# Patient Record
Sex: FEMALE | Race: WHITE | NOT HISPANIC OR LATINO | URBAN - METROPOLITAN AREA
[De-identification: names, ages, dates, MRNs, and addresses within clinical notes are randomized per-mention and may not be internally consistent; named-entity substitution may affect disease eponyms.]

---

## 2017-03-09 PROBLEM — Z00.00 ENCOUNTER FOR PREVENTIVE HEALTH EXAMINATION: Status: ACTIVE | Noted: 2017-03-09

## 2021-04-08 ENCOUNTER — INPATIENT (INPATIENT)
Facility: HOSPITAL | Age: 86
LOS: 0 days | Discharge: LEFT BEFORE DISCHARGE | End: 2021-04-08
Attending: INTERNAL MEDICINE | Admitting: INTERNAL MEDICINE
Payer: MEDICARE

## 2021-04-08 VITALS
SYSTOLIC BLOOD PRESSURE: 148 MMHG | HEART RATE: 84 BPM | OXYGEN SATURATION: 100 % | RESPIRATION RATE: 18 BRPM | DIASTOLIC BLOOD PRESSURE: 83 MMHG

## 2021-04-08 VITALS
TEMPERATURE: 97 F | HEART RATE: 104 BPM | DIASTOLIC BLOOD PRESSURE: 80 MMHG | OXYGEN SATURATION: 98 % | SYSTOLIC BLOOD PRESSURE: 182 MMHG | RESPIRATION RATE: 18 BRPM

## 2021-04-08 LAB
ALBUMIN SERPL ELPH-MCNC: 4.9 G/DL — SIGNIFICANT CHANGE UP (ref 3.5–5.2)
ALP SERPL-CCNC: 86 U/L — SIGNIFICANT CHANGE UP (ref 30–115)
ALT FLD-CCNC: 6 U/L — SIGNIFICANT CHANGE UP (ref 0–41)
ANION GAP SERPL CALC-SCNC: 14 MMOL/L — SIGNIFICANT CHANGE UP (ref 7–14)
APPEARANCE UR: CLEAR — SIGNIFICANT CHANGE UP
APTT BLD: 30.8 SEC — SIGNIFICANT CHANGE UP (ref 27–39.2)
AST SERPL-CCNC: 18 U/L — SIGNIFICANT CHANGE UP (ref 0–41)
BACTERIA # UR AUTO: NEGATIVE — SIGNIFICANT CHANGE UP
BASOPHILS # BLD AUTO: 0.07 K/UL — SIGNIFICANT CHANGE UP (ref 0–0.2)
BASOPHILS NFR BLD AUTO: 0.6 % — SIGNIFICANT CHANGE UP (ref 0–1)
BILIRUB SERPL-MCNC: 0.3 MG/DL — SIGNIFICANT CHANGE UP (ref 0.2–1.2)
BILIRUB UR-MCNC: NEGATIVE — SIGNIFICANT CHANGE UP
BLD GP AB SCN SERPL QL: SIGNIFICANT CHANGE UP
BUN SERPL-MCNC: 36 MG/DL — HIGH (ref 10–20)
CALCIUM SERPL-MCNC: 9.7 MG/DL — SIGNIFICANT CHANGE UP (ref 8.5–10.1)
CHLORIDE SERPL-SCNC: 103 MMOL/L — SIGNIFICANT CHANGE UP (ref 98–110)
CO2 SERPL-SCNC: 24 MMOL/L — SIGNIFICANT CHANGE UP (ref 17–32)
COLOR SPEC: SIGNIFICANT CHANGE UP
CREAT SERPL-MCNC: 1.2 MG/DL — SIGNIFICANT CHANGE UP (ref 0.7–1.5)
DIFF PNL FLD: NEGATIVE — SIGNIFICANT CHANGE UP
EOSINOPHIL # BLD AUTO: 0.33 K/UL — SIGNIFICANT CHANGE UP (ref 0–0.7)
EOSINOPHIL NFR BLD AUTO: 2.9 % — SIGNIFICANT CHANGE UP (ref 0–8)
EPI CELLS # UR: 3 /HPF — SIGNIFICANT CHANGE UP (ref 0–5)
ETHANOL SERPL-MCNC: <10 MG/DL — SIGNIFICANT CHANGE UP
GLUCOSE SERPL-MCNC: 111 MG/DL — HIGH (ref 70–99)
GLUCOSE UR QL: NEGATIVE — SIGNIFICANT CHANGE UP
HCT VFR BLD CALC: 46 % — SIGNIFICANT CHANGE UP (ref 37–47)
HGB BLD-MCNC: 14.9 G/DL — SIGNIFICANT CHANGE UP (ref 12–16)
HYALINE CASTS # UR AUTO: 0 /LPF — SIGNIFICANT CHANGE UP (ref 0–7)
IMM GRANULOCYTES NFR BLD AUTO: 0.3 % — SIGNIFICANT CHANGE UP (ref 0.1–0.3)
INR BLD: 0.9 RATIO — SIGNIFICANT CHANGE UP (ref 0.65–1.3)
KETONES UR-MCNC: NEGATIVE — SIGNIFICANT CHANGE UP
LACTATE SERPL-SCNC: 1.2 MMOL/L — SIGNIFICANT CHANGE UP (ref 0.7–2)
LEUKOCYTE ESTERASE UR-ACNC: ABNORMAL
LIDOCAIN IGE QN: 48 U/L — SIGNIFICANT CHANGE UP (ref 7–60)
LYMPHOCYTES # BLD AUTO: 37.6 % — SIGNIFICANT CHANGE UP (ref 20.5–51.1)
LYMPHOCYTES # BLD AUTO: 4.21 K/UL — HIGH (ref 1.2–3.4)
MAGNESIUM SERPL-MCNC: 2.3 MG/DL — SIGNIFICANT CHANGE UP (ref 1.8–2.4)
MCHC RBC-ENTMCNC: 29.5 PG — SIGNIFICANT CHANGE UP (ref 27–31)
MCHC RBC-ENTMCNC: 32.4 G/DL — SIGNIFICANT CHANGE UP (ref 32–37)
MCV RBC AUTO: 91.1 FL — SIGNIFICANT CHANGE UP (ref 81–99)
MONOCYTES # BLD AUTO: 1.08 K/UL — HIGH (ref 0.1–0.6)
MONOCYTES NFR BLD AUTO: 9.7 % — HIGH (ref 1.7–9.3)
NEUTROPHILS # BLD AUTO: 5.47 K/UL — SIGNIFICANT CHANGE UP (ref 1.4–6.5)
NEUTROPHILS NFR BLD AUTO: 48.9 % — SIGNIFICANT CHANGE UP (ref 42.2–75.2)
NITRITE UR-MCNC: NEGATIVE — SIGNIFICANT CHANGE UP
NRBC # BLD: 0 /100 WBCS — SIGNIFICANT CHANGE UP (ref 0–0)
PH UR: 6.5 — SIGNIFICANT CHANGE UP (ref 5–8)
PLATELET # BLD AUTO: 302 K/UL — SIGNIFICANT CHANGE UP (ref 130–400)
POTASSIUM SERPL-MCNC: 4.2 MMOL/L — SIGNIFICANT CHANGE UP (ref 3.5–5)
POTASSIUM SERPL-SCNC: 4.2 MMOL/L — SIGNIFICANT CHANGE UP (ref 3.5–5)
PROT SERPL-MCNC: 8.1 G/DL — HIGH (ref 6–8)
PROT UR-MCNC: ABNORMAL
PROTHROM AB SERPL-ACNC: 10.3 SEC — SIGNIFICANT CHANGE UP (ref 9.95–12.87)
RAPID RVP RESULT: SIGNIFICANT CHANGE UP
RBC # BLD: 5.05 M/UL — SIGNIFICANT CHANGE UP (ref 4.2–5.4)
RBC # FLD: 13.9 % — SIGNIFICANT CHANGE UP (ref 11.5–14.5)
RBC CASTS # UR COMP ASSIST: 9 /HPF — HIGH (ref 0–4)
SARS-COV-2 RNA SPEC QL NAA+PROBE: SIGNIFICANT CHANGE UP
SODIUM SERPL-SCNC: 141 MMOL/L — SIGNIFICANT CHANGE UP (ref 135–146)
SP GR SPEC: 1.04 — HIGH (ref 1.01–1.03)
TROPONIN T SERPL-MCNC: <0.01 NG/ML — SIGNIFICANT CHANGE UP
UROBILINOGEN FLD QL: SIGNIFICANT CHANGE UP
WBC # BLD: 11.19 K/UL — HIGH (ref 4.8–10.8)
WBC # FLD AUTO: 11.19 K/UL — HIGH (ref 4.8–10.8)
WBC UR QL: 2 /HPF — SIGNIFICANT CHANGE UP (ref 0–5)

## 2021-04-08 PROCEDURE — 72125 CT NECK SPINE W/O DYE: CPT | Mod: 26,MH

## 2021-04-08 PROCEDURE — 99223 1ST HOSP IP/OBS HIGH 75: CPT

## 2021-04-08 PROCEDURE — 71260 CT THORAX DX C+: CPT | Mod: 26,MH

## 2021-04-08 PROCEDURE — 72170 X-RAY EXAM OF PELVIS: CPT | Mod: 26

## 2021-04-08 PROCEDURE — 99285 EMERGENCY DEPT VISIT HI MDM: CPT | Mod: 25

## 2021-04-08 PROCEDURE — 70450 CT HEAD/BRAIN W/O DYE: CPT | Mod: 26,MH

## 2021-04-08 PROCEDURE — 93010 ELECTROCARDIOGRAM REPORT: CPT

## 2021-04-08 PROCEDURE — 12011 RPR F/E/E/N/L/M 2.5 CM/<: CPT

## 2021-04-08 PROCEDURE — 71045 X-RAY EXAM CHEST 1 VIEW: CPT | Mod: 26

## 2021-04-08 PROCEDURE — 74177 CT ABD & PELVIS W/CONTRAST: CPT | Mod: 26,MH

## 2021-04-08 RX ORDER — TETANUS TOXOID, REDUCED DIPHTHERIA TOXOID AND ACELLULAR PERTUSSIS VACCINE, ADSORBED 5; 2.5; 8; 8; 2.5 [IU]/.5ML; [IU]/.5ML; UG/.5ML; UG/.5ML; UG/.5ML
0.5 SUSPENSION INTRAMUSCULAR ONCE
Refills: 0 | Status: COMPLETED | OUTPATIENT
Start: 2021-04-08 | End: 2021-04-08

## 2021-04-08 RX ORDER — CEFTRIAXONE 500 MG/1
1000 INJECTION, POWDER, FOR SOLUTION INTRAMUSCULAR; INTRAVENOUS ONCE
Refills: 0 | Status: COMPLETED | OUTPATIENT
Start: 2021-04-08 | End: 2021-04-08

## 2021-04-08 RX ADMIN — TETANUS TOXOID, REDUCED DIPHTHERIA TOXOID AND ACELLULAR PERTUSSIS VACCINE, ADSORBED 0.5 MILLILITER(S): 5; 2.5; 8; 8; 2.5 SUSPENSION INTRAMUSCULAR at 03:40

## 2021-04-08 RX ADMIN — CEFTRIAXONE 100 MILLIGRAM(S): 500 INJECTION, POWDER, FOR SOLUTION INTRAMUSCULAR; INTRAVENOUS at 05:07

## 2021-04-08 NOTE — ED ADULT TRIAGE NOTE - CHIEF COMPLAINT QUOTE
as per son pt fell off her bed PTA, + head trauma , and altered mental status. unknown anticoagulations. trauma alert activated.

## 2021-04-08 NOTE — ED ADULT NURSE NOTE - NSIMPLEMENTINTERV_GEN_ALL_ED
Implemented All Fall with Harm Risk Interventions:  [place] to call system. Call bell, personal items and telephone within reach. Instruct patient to call for assistance. Room bathroom lighting operational. Non-slip footwear when patient is off stretcher. Physically safe environment: no spills, clutter or unnecessary equipment. Stretcher in lowest position, wheels locked, appropriate side rails in place. Provide visual cue, wrist band, yellow gown, etc. Monitor gait and stability. Monitor for mental status changes and reorient to person, place, and time. Review medications for side effects contributing to fall risk. Reinforce activity limits and safety measures with patient and family. Provide visual clues: red socks.

## 2021-04-08 NOTE — ED PROVIDER NOTE - PROGRESS NOTE DETAILS
CP: Patient cleared by trauma. CP: Patient cleared by trauma, Dr. Calvin. CP: Discussed case with MAR. Will admit to floor for AMS. Discussed neurology should see patient on the floor. DL - Multiple extensive conversations had with family regarding patients disposition. Patient and son at bedside, initially okay with staying in hospital for neuro evaluation and now patient's son requesting patient leave AMA. Explained to patient that she might likely had TIA, and complications could include coma, inability to perform independent daily activities, and even death. Patient and son aware of possible complications and still want to go home. Patient and son verbalize understanding. Strict return precautions discussed with verbalized understanding. MAR informed of patient and family member decision.

## 2021-04-08 NOTE — ED PROVIDER NOTE - CARE PROVIDER_API CALL
Ziyad Ware)  Neurology  [address] [place] [address], [address]  [place] [address]  Phone: [phone]  Fax: [phone]  Follow Up Time: Routine

## 2021-04-08 NOTE — CONSULT NOTE ADULT - SUBJECTIVE AND OBJECTIVE BOX
TRAUMA ACTIVATION LEVEL:      MECHANISM OF INJURY:   [] Blunt     [] MVC	  [x] Fall	  [] Pedestrian Struck	  [] Motorcycle     [] Assault     [] Bicycle collision    [] Sports injury    [] Penetrating    [] Gun Shot Wound      [] Stab Wound    GCS: 15 	E: 4	V: 5	M: 6    HPI:    Patient is a 89 year old female w/ PMHx of hypothyroidism and afib not on AC presents to ED as a unwitnessed fall at home tonight. Patient found on floor by son, who reports patient is not acting her baseline. Patient was able to call the son by cell phone after falling to call for help. +HT, unknown LOC. Patient sustained laceration to R forehead with a contusion that was repaired in the ED. Patient denies any shaking, tongue biting, fainting or any prodromal event leading up to the fall.  Trauma Alert s/p fall, +HT.  Trauma assessment in ED: ABCs intact , GCS 15 , AAOx3.    PAST MEDICAL & SURGICAL HISTORY:      Allergies    No Known Allergies    Intolerances        Home Medications:      ROS: 10-system review is otherwise negative except HPI above.      Primary Survey:    A - airway intact  B - bilateral breath sounds and good chest rise  C - palpable pulses in all extremities  D - GCS 15 on arrival, RODRIGUEZ  Exposure obtained    Vital Signs Last 24 Hrs  T(C): 35.9 (2021 02:58), Max: 35.9 (2021 02:58)  T(F): 96.7 (2021 02:58), Max: 96.7 (2021 02:58)  HR: 84 (2021 05:08) (84 - 104)  BP: 148/83 (2021 05:08) (148/83 - 182/80)  BP(mean): 109 (2021 05:08) (109 - 109)  RR: 18 (2021 05:08) (18 - 18)  SpO2: 100% (2021 05:08) (98% - 100%)    Secondary Survey:   General: NAD  HEENT: Normocephalic, right forehead laceration, right forehead constusion, right pupil 1mm reactive, left pupil 3mm reactive.  Neck: Soft, midline trachea. no c-spine tenderness  Chest: No chest wall tenderness, no subcutaneous emphysema   Cardiac: S1, S2, RRR  Respiratory: Bilateral breath sounds, clear and equal bilaterally  Abdomen: Soft, non-distended, non-tender, no rebound, no guarding.  Groin: Normal appearing, pelvis stable   Ext:  Moving b/l upper and lower extremities. Palpable Radial b/l UE, b/l DP palpable in LE.   Back: No T/L/S spine tenderness, No palpable runoff/stepoff/deformity  Rectal: No kishan blood, YADIRA with good tone    FAST: Negative    Labs:  CAPILLARY BLOOD GLUCOSE      POCT Blood Glucose.: 103 mg/dL (2021 03:04)                          14.9   11.19 )-----------( 302      ( 2021 03:00 )             46.0       Auto Neutrophil %: 48.9 % (21 @ 03:00)  Auto Immature Granulocyte %: 0.3 % (21 @ 03:00)        141  |  103  |  36<H>  ----------------------------<  111<H>  4.2   |  24  |  1.2      Magnesium, Serum: 2.3 mg/dL (21 @ 04:31)      LFTs:             8.1  | 0.3  | 18       ------------------[86      ( 2021 03:00 )  4.9  | x    | 6           Lipase:48     Amylase:x         Lactate, Blood: 1.2 mmol/L (21 @ 03:00)      Coags:     10.30  ----< 0.90    ( 2021 03:00 )     30.8        CARDIAC MARKERS ( 2021 04:31 )  x     / <0.01 ng/mL / x     / x     / x            Alcohol, Blood: <10 mg/dL (21 @ 03:00)    Urinalysis Basic - ( 2021 04:35 )    Color: Light Yellow / Appearance: Clear / S.036 / pH: x  Gluc: x / Ketone: Negative  / Bili: Negative / Urobili: <2 mg/dL   Blood: x / Protein: 30 mg/dL / Nitrite: Negative   Leuk Esterase: Small / RBC: 9 /HPF / WBC 2 /HPF   Sq Epi: x / Non Sq Epi: 3 /HPF / Bacteria: Negative            Alcohol, Blood: <10 mg/dL (21 @ 03:00)      RADIOLOGY & ADDITIONAL STUDIES:  ---------------------------------------------------------------------------------------     TRAUMA ACTIVATION LEVEL:  2 alert    MECHANISM OF INJURY:   [] Blunt     [] MVC	  [x] Fall	  [] Pedestrian Struck	  [] Motorcycle     [] Assault     [] Bicycle collision    [] Sports injury    [] Penetrating    [] Gun Shot Wound      [] Stab Wound    GCS: 15 	E: 4	V: 5	M: 6    HPI:    Patient is a 89 year old female w/ PMHx of hypothyroidism and afib not on AC presents to ED as a unwitnessed fall at home tonight. Patient found on floor by son, who reports patient is not acting her baseline. Patient was able to call the son by cell phone after falling to call for help. +HT, unknown LOC. Patient sustained laceration to R forehead with a contusion that was repaired in the ED. Patient denies any shaking, tongue biting, fainting or any prodromal event leading up to the fall.  Trauma Alert s/p fall, +HT.  Trauma assessment in ED: ABCs intact , GCS 15 , AAOx3.    PAST MEDICAL & SURGICAL HISTORY:      Allergies    No Known Allergies    Intolerances        Home Medications:      ROS: 10-system review is otherwise negative except HPI above.      Primary Survey:    A - airway intact  B - bilateral breath sounds and good chest rise  C - palpable pulses in all extremities  D - GCS 15 on arrival, RODRIGUEZ  Exposure obtained    Vital Signs Last 24 Hrs  T(C): 35.9 (2021 02:58), Max: 35.9 (2021 02:58)  T(F): 96.7 (2021 02:58), Max: 96.7 (2021 02:58)  HR: 84 (2021 05:08) (84 - 104)  BP: 148/83 (2021 05:08) (148/83 - 182/80)  BP(mean): 109 (2021 05:08) (109 - 109)  RR: 18 (2021 05:08) (18 - 18)  SpO2: 100% (2021 05:08) (98% - 100%)    Secondary Survey:   General: NAD  HEENT: Normocephalic, right forehead laceration, right forehead constusion, right pupil 1mm reactive, left pupil 3mm reactive.  Neck: Soft, midline trachea. no c-spine tenderness  Chest: No chest wall tenderness, no subcutaneous emphysema   Cardiac: S1, S2, RRR  Respiratory: Bilateral breath sounds, clear and equal bilaterally  Abdomen: Soft, non-distended, non-tender, no rebound, no guarding.  Groin: Normal appearing, pelvis stable   Ext:  Moving b/l upper and lower extremities. Palpable Radial b/l UE, b/l DP palpable in LE.   Back: No T/L/S spine tenderness, No palpable runoff/stepoff/deformity  Rectal: No kishan blood, YADIRA with good tone    FAST: Negative    Labs:  CAPILLARY BLOOD GLUCOSE      POCT Blood Glucose.: 103 mg/dL (2021 03:04)                          14.9   11.19 )-----------( 302      ( 2021 03:00 )             46.0       Auto Neutrophil %: 48.9 % (21 @ 03:00)  Auto Immature Granulocyte %: 0.3 % (21 @ 03:00)        141  |  103  |  36<H>  ----------------------------<  111<H>  4.2   |  24  |  1.2      Magnesium, Serum: 2.3 mg/dL (21 @ 04:31)      LFTs:             8.1  | 0.3  | 18       ------------------[86      ( 2021 03:00 )  4.9  | x    | 6           Lipase:48     Amylase:x         Lactate, Blood: 1.2 mmol/L (21 @ 03:00)      Coags:     10.30  ----< 0.90    ( 2021 03:00 )     30.8        CARDIAC MARKERS ( 2021 04:31 )  x     / <0.01 ng/mL / x     / x     / x            Alcohol, Blood: <10 mg/dL (21 @ 03:00)    Urinalysis Basic - ( 2021 04:35 )    Color: Light Yellow / Appearance: Clear / S.036 / pH: x  Gluc: x / Ketone: Negative  / Bili: Negative / Urobili: <2 mg/dL   Blood: x / Protein: 30 mg/dL / Nitrite: Negative   Leuk Esterase: Small / RBC: 9 /HPF / WBC 2 /HPF   Sq Epi: x / Non Sq Epi: 3 /HPF / Bacteria: Negative      Alcohol, Blood: <10 mg/dL (21 @ 03:00)      RADIOLOGY & ADDITIONAL STUDIES:    < from: CT Head No Cont (21 @ 03:21) >  CT HEAD:  1.  No evidence of acute intracranial hemorrhage or midline shift.  2.  Small right frontal extracalvarial scalp hematoma.  3.  Moderate chronic microvascular ischemic changes.    CT CERVICAL SPINE:  1.  No acute cervical spine fracture or subluxation.  2.  Multilevel degenerative changes as described above.    < end of copied text >    < from: CT Chest w/ IV Cont (21 @ 03:30) >  No evidence of acute traumatic injury in the chest, abdomen or pelvis.    < end of copied text >  < from: Xray Chest 1 View AP/PA (21 @ 04:29) >  No radiographic evidence of acute cardiopulmonary disease.    < end of copied text >

## 2021-04-08 NOTE — ED PROVIDER NOTE - OBJECTIVE STATEMENT
89 y/o F PMHx hypothyroidism and afib not on AC presents to ED s/p unwitnessed fall home tonight. Patient found on floor by son, who reports patient is not acting her baseline. +HT, unknown LOC. Patient sustained laceration to R forehead. Pt triaged as trauma alert.

## 2021-04-08 NOTE — ED PROVIDER NOTE - PATIENT PORTAL LINK FT
You can access the FollowMyHealth Patient Portal offered by [facility] by registering at the following website: [email or web address]. By joining [facility]’s FollowMyHealth portal, you will also be able to view your health information using other applications (apps) compatible with our system.

## 2021-04-08 NOTE — CONSULT NOTE ADULT - ASSESSMENT
ASSESSMENT:  Patient is a 89 year old female w/ PMHx of hypothyroidism and afib not on AC presents to ED as a unwitnessed fall at home tonight. Patient found on floor by son, who reports patient is not acting her baseline. Patient was able to call the son by cell phone after falling to call for help. +HT, unknown LOC. Patient sustained laceration to R forehead with a contusion that was repaired in the ED. Patient denies any shaking, tongue biting, fainting or any prodromal event leading up to the fall.  Trauma Alert s/p fall, +HT.  Trauma assessment in ED: ABCs intact , GCS 15 , AAOx3.    Injuries identified:   - right forehead laceration  - right forehead contusion      PLAN:   - Trauma Labs: (CBC, BMP, Coags, T&S, UA, EtOH level)    Trauma Imaging to include the following:  - CXR, Pelvic Xray  - CT Head,  CT C-spine, CT Max/Face, CT Chest, CT Abd/Pelvis  - Extremity films: None    Additional consultations:  - PT/Rehab/SW  - Hospitalist/Medicine     Disposition pending results of above labs and imaging  Above plan discussed with Trauma attending, Dr. Nicole , patient, patient family, and ED team  --------------------------------------------------------------------------------------  04-08-21 @ 06:44 ASSESSMENT:  Patient is a 89 year old female w/ PMHx of hypothyroidism and afib not on AC presents to ED as a unwitnessed fall at home tonight. Patient found on floor by son, who reports patient is not acting her baseline. Patient was able to call the son by cell phone after falling to call for help. +HT, unknown LOC. Patient sustained laceration to R forehead with a contusion that was repaired in the ED. Patient denies any shaking, tongue biting, fainting or any prodromal event leading up to the fall.  Trauma Alert s/p fall, +HT.  Trauma assessment in ED: ABCs intact , GCS 15 , AAOx3.    Injuries identified:   - right forehead laceration  - right forehead contusion    PLAN:   - Trauma Labs: (CBC, BMP, Coags, T&S, UA, EtOH level)    Trauma Imaging to include the following:  - CXR, Pelvic Xray  - CT Head,  CT C-spine, CT Max/Face, CT Chest, CT Abd/Pelvis  - Extremity films: None    Additional consultations:  - PT/Rehab/SW  - Hospitalist/Medicine     Disposition pending results of above labs and imaging  Above plan discussed with Trauma attending, Dr. Nicole , patient, patient family, and ED team  --------------------------------------------------------------------------------------  04-08-21 @ 06:44    Senior Note  I have personally examined and evaluated the patient  I agree with the above plan and note, and I have edited where appropriate  External signs of injury on exam: R frontal hematoma &abrasion  CTs reviewed, as above. No acute traumatic findings  No acute trauma surgical intervention  Plain film wet reads as per ED  Dispo as per ED  If pt admitted, trauma team to perform TTS in AM  Surgical Attending aware and agrees with plan

## 2021-04-08 NOTE — ED PROVIDER NOTE - PHYSICAL EXAMINATION
CONSTITUTIONAL: well developed, well nourished, no acute distress  TRAUMA: ABC intact, GCS 14  HEAD: normocephalic, atraumatic  EYES: pupils unequal R side 2mm, L 4mm, no raccoon eyes  ENT: no nasal discharge, no santamaria sign, moist mucous membranes moist   NECK: cervical collar in place, no midline tenderness, no stepoffs, no deformity  CV: regular rate and rhythm, equal distal pulses  RESP: lungs clear to auscultation bilaterally, normal work of breathing, symmetric rise and fall of chest   CHEST: no chest wall tenderness, no crepitus, no clavicular deformity/tenting  ABD: soft, nondistended, nontender, no rebound, no guarding, no rigidity no pelvic instability  BACK: no midline T/L/S-spine tenderness, no stepoffs, no deformity, no saddle paresthesia  EXT: no obvious deformity, no tenderness of extremities, full ROM, cap refill < 2 seconds  SKIN: no rashes, +1.5cm linear laceration to R forehead   NEURO: A&Ox2, no FND, moving all extremities   PSYCH: cooperative CONSTITUTIONAL: well developed, well nourished, no acute distress  TRAUMA: ABC intact, GCS 14  HEAD: normocephalic, atraumatic  EYES: pupils unequal R side 2mm, L 4mm, no raccoon eyes  ENT: no nasal discharge, no santamaria sign, moist mucous membranes moist   NECK: cervical collar in place, no midline tenderness, no stepoffs, no deformity  CV: regular rate and rhythm, equal distal pulses  RESP: lungs clear to auscultation bilaterally, normal work of breathing, symmetric rise and fall of chest   CHEST: no chest wall tenderness, no crepitus, no clavicular deformity/tenting  ABD: soft, nondistended, nontender, no rebound, no guarding, no rigidity no pelvic instability  BACK: no midline T/L/S-spine tenderness, no stepoffs, no deformity, no saddle paresthesia  EXT: no obvious deformity, no tenderness of extremities, full ROM, cap refill < 2 seconds  SKIN: no rashes, +2cm linear laceration to R forehead   NEURO: A&Ox2, no FND, moving all extremities   PSYCH: cooperative CONSTITUTIONAL: well developed, well nourished, no acute distress  TRAUMA: ABC intact, GCS 14  HEAD: normocephalic, atraumatic  EYES: pupils unequal R side 2mm, L 4mm, reactive, no raccoon eyes  ENT: no nasal discharge, no santamaria sign, moist mucous membranes moist   NECK: cervical collar in place, no midline tenderness, no stepoffs, no deformity  CV: regular rate and rhythm, equal distal pulses  RESP: lungs clear to auscultation bilaterally, normal work of breathing, symmetric rise and fall of chest   CHEST: no chest wall tenderness, no crepitus, no clavicular deformity/tenting  ABD: soft, nondistended, nontender, no rebound, no guarding, no rigidity no pelvic instability  BACK: no midline T/L/S-spine tenderness, no stepoffs, no deformity, no saddle paresthesia  EXT: no obvious deformity, no tenderness of extremities, full ROM, cap refill < 2 seconds  SKIN: no rashes, +2cm linear laceration to R forehead   NEURO: A&Ox2, no FND, moving all extremities   PSYCH: cooperative

## 2021-04-08 NOTE — ED PROVIDER NOTE - NSFOLLOWUPINSTRUCTIONS_ED_ALL_ED_FT
Transient Ischemic Attack    A transient ischemic attack (TIA) is a "warning stroke" that causes stroke-like symptoms. Unlike a stroke, a TIA does not cause permanent damage to the brain. The symptoms of a TIA can happen very fast and do not last long. It is important to know the symptoms of a TIA and what to do. This can help prevent a major stroke or death.     CAUSES  A TIA is caused by a temporary blockage in an artery in the brain or neck (carotid artery). The blockage does not allow the brain to get the blood supply it needs and can cause different symptoms. The blockage can be caused by either:    A blood clot.  Fatty buildup (plaque) in a neck or brain artery.     RISK FACTORS  High blood pressure (hypertension).  High cholesterol.  Diabetes mellitus.  Heart disease.  The buildup of plaque in the blood vessels (peripheral artery disease or atherosclerosis).  The buildup of plaque in the blood vessels that provide blood and oxygen to the brain (carotid artery stenosis).  An abnormal heart rhythm (atrial fibrillation).  Obesity.  Using any tobacco products, including cigarettes, chewing tobacco, or electronic cigarettes.   Taking oral contraceptives, especially in combination with using tobacco.  Physical inactivity.  A diet high in fats, salt (sodium), and calories.  Excessive alcohol use.  Use of illegal drugs (especially cocaine and methamphetamine).  Being male.  Being .  Being over the age of 55 years.  Family history of stroke.  Previous history of blood clots, stroke, TIA, or heart attack.  Sickle cell disease.    SIGNS AND SYMPTOMS  TIA symptoms are the same as a stroke but are temporary. These symptoms usually develop suddenly, or may be newly present upon waking from sleep:    Sudden weakness or numbness of the face, arm, or leg, especially on one side of the body.  Sudden trouble walking or difficulty moving arms or legs.   Sudden confusion.  Sudden personality changes.  Trouble speaking (aphasia) or understanding.  Difficulty swallowing.   Sudden trouble seeing in one or both eyes.  Double vision.  Dizziness.  Loss of balance or coordination.  Sudden severe headache with no known cause.  Trouble reading or writing.  Loss of bowel or bladder control.  Loss of consciousness.     DIAGNOSIS  Your health care provider may be able to determine the presence or absence of a TIA based on your symptoms, history, and physical exam. CT scan of the brain is usually performed to help identify a TIA. Other tests may include:    Electrocardiography (ECG).  Continuous heart monitoring.  Echocardiography.  Carotid ultrasonography.  MRI.  A scan of the brain circulation.  Blood tests.    TREATMENT  Since the symptoms of TIA are the same as a stroke, it is important to seek treatment as soon as possible. You may need a medicine to dissolve a blood clot (thrombolytic) if that is the cause of the TIA. This medicine cannot be given if too much time has passed. Treatment may also include:     Rest, oxygen, fluids through an IV tube, and medicines to thin the blood (anticoagulants).   Measures will be taken to prevent short-term and long-term complications, including infection from breathing foreign material into the lungs (aspiration pneumonia), blood clots in the legs, and falls.  Procedures to either remove plaque in the carotid arteries or dilate carotid arteries that have narrowed due to plaque. Those procedures are:  Carotid endarterectomy.   Carotid angioplasty and stenting.   Medicines and diet may be used to address diabetes, high blood pressure, and other underlying risk factors.    HOME CARE INSTRUCTIONS  Take medicines only as directed by your health care provider. Follow the directions carefully. Medicines may be used to control risk factors for a stroke. Be sure you understand all your medicine instructions.  You may be told to take aspirin or the anticoagulant warfarin. Warfarin needs to be taken exactly as instructed.  Taking too much or too little warfarin is dangerous. Too much warfarin increases the risk of bleeding. Too little warfarin continues to allow the risk for blood clots. While taking warfarin, you will need to have regular blood tests to measure your blood clotting time. A PT blood test measures how long it takes for blood to clot. Your PT is used to calculate another value called an INR. Your PT and INR help your health care provider to adjust your dose of warfarin. The dose can change for many reasons. It is critically important that you take warfarin exactly as prescribed.  Many foods, especially foods high in vitamin K can interfere with warfarin and affect the PT and INR. Foods high in vitamin K include spinach, kale, broccoli, cabbage, fede and turnip greens, [place] sprouts, peas, cauliflower, seaweed, and parsley, as well as beef and pork liver, green tea, and soybean oil. You should eat a consistent amount of foods high in vitamin K. Avoid major changes in your diet, or notify your health care provider before changing your diet. Arrange a visit with a dietitian to answer your questions.  Many medicines can interfere with warfarin and affect the PT and INR. You must tell your health care provider about any and all medicines you take; this includes all vitamins and supplements. Be especially cautious with aspirin and anti-inflammatory medicines. Do not take or discontinue any prescribed or over-the-counter medicine except on the advice of your health care provider or pharmacist.  Warfarin can have side effects, such as excessive bruising or bleeding. You will need to hold pressure over cuts for longer than usual. Your health care provider or pharmacist will discuss other potential side effects.  Avoid sports or activities that may cause injury or bleeding.  Be careful when shaving, flossing your teeth, or handling sharp objects.  Alcohol can change the body's ability to handle warfarin. It is best to avoid alcoholic drinks or consume only very small amounts while taking warfarin. Notify your health care provider if you change your alcohol intake.  Notify your dentist or other health care providers before procedures.  Eat a diet that includes 5 or more servings of fruits and vegetables each day. This may reduce the risk of stroke. Certain diets may be prescribed to address high blood pressure, high cholesterol, diabetes, or obesity.  A diet low in sodium, saturated fat, trans fat, and cholesterol is recommended to manage high blood pressure.  A diet low in saturated fat, trans fat, and cholesterol, and high in fiber may control cholesterol levels.  A controlled-carbohydrate, controlled-sugar diet is recommended to manage diabetes.  A reduced-calorie diet that is low in sodium, saturated fat, trans fat, and cholesterol is recommended to manage obesity.  Maintain a healthy weight.  Stay physically active. It is recommended that you get at least 30 minutes of activity on most or all days.  Do not use any tobacco products, including cigarettes, chewing tobacco, or electronic cigarettes. If you need help quitting, ask your health care provider.  Limit alcohol intake to no more than 1 drink per day for nonpregnant women and 2 drinks per day for men. One drink equals 12 ounces of beer, 5 ounces of wine, or 1½ ounces of hard liquor.  Do not abuse drugs.  A safe home environment is important to reduce the risk of falls. Your health care provider may arrange for specialists to evaluate your home. Having grab bars in the bedroom and bathroom is often important. Your health care provider may arrange for equipment to be used at home, such as raised toilets and a seat for the shower.  Follow all instructions for follow-up with your health care provider. This is very important. This includes any referrals and lab tests. Proper follow-up can prevent a stroke or another TIA from occurring.    PREVENTION  The risk of a TIA can be decreased by appropriately treating high blood pressure, high cholesterol, diabetes, heart disease, and obesity, and by quitting smoking, limiting alcohol, and staying physically active.    SEEK MEDICAL CARE IF:  You have personality changes.  You have difficulty swallowing.   You are seeing double.  You have dizziness.  You have a fever.     SEEK IMMEDIATE MEDICAL CARE IF:  Any of the following symptoms may represent a serious problem that is an emergency. Do not wait to see if the symptoms will go away. Get medical help right away. Call your local emergency services (911 in [place] Do not drive yourself to the hospital.    You have sudden weakness or numbness of the face, arm, or leg, especially on one side of the body.  You have sudden trouble walking or difficulty moving arms or legs.   You have sudden confusion.  You have trouble speaking (aphasia) or understanding.  You have sudden trouble seeing in one or both eyes.  You have a loss of balance or coordination.  You have a sudden, severe headache with no known cause.  You have new chest pain or an irregular heartbeat.  You have a partial or total loss of consciousness.    MAKE SURE YOU:  Understand these instructions.  Will watch your condition.  Will get help right away if you are not doing well or get worse.    ADDITIONAL NOTES AND INSTRUCTIONS    Please follow up with your Primary MD in 24-48 hr.  Seek immediate medical care for any new/worsening signs or symptoms.

## 2021-04-08 NOTE — ED ADULT NURSE REASSESSMENT NOTE - NS ED NURSE REASSESS COMMENT FT1
2 cm laceration to the right side of forehead noted. 2 cm laceration to the right side of forehead noted. Tetanus given to the left arm.

## 2021-04-08 NOTE — ED PROVIDER NOTE - CLINICAL SUMMARY MEDICAL DECISION MAKING FREE TEXT BOX
89 yo F, hx of afib, initially thought to be on AC but when official med report was obtained, is not, hypothyroidism, baseline AAOx3, minimally ambulatory brought in roughly 3 hours sp fall. Patient reports she slid out of bed, hit her head, was able to crawl to the threshold of her room and call for her son. He found her with bruising/abrasion to R side of forehead, but AAOx3, acting normally, speaking clearly. He washed up her injury and wanted to bring her to the ED but she refused. He was monitoring her mental status and about 1 hour PTA noted her to have an acute change in mental status with abnormal speech. He initially described it as gibberish, then stated she could not find her words. On arrival to the ED speech had improved however patient was noted to have anisocoria.     Trauma alert called on arrival for age, presumed AC use. Initial concern, given anisocoria, was ICH.    VS normal, has contusion to R forehead, otherwise no other injuries, oriented x 2 (not time), irregular pupils, moving extremities, clear lungs, RRR, soft. NT, ND abdomen.    Pan CT scan negative for acute fracutres, labs unremarkable.    While in ED anisocoria resolved, patient still oriented x 2 (son feels she is just nervous being asked so many questions and does note she has been having more difficulty with time/year over the last few months)    Without ICH and with known hx of afib, not on AC, sx are concerning for stroke/TIA, perhaps initial fall was secondary to weakness from CNS process. Plan was for admission for further stroke work up as patient was cleared from trauma.    Initially patient/family were amenable to admission, however after discussing with other family (patient's daughter who is an RN)  it was decided that they would prefer to go home.     Dr. Cee and I both had long discussions with patient and her son regarding the fact that TIA/small stroke can often be a precursor to a more devastating event leading to permanent disability or death. They still prefer to leave.    The patient was able to verbally state back to me my concerns regarding stroke both tonight and future shelton with worse symptoms.  She states, "I just want to be home, even if that happens. But I don't think I had a stroke."    Patient and family aware of need for close follow up, return precautions and also that they are welcome to return if they change their mind about admission.

## 2021-04-08 NOTE — ED PROVIDER NOTE - NSFOLLOWUPCLINICS_GEN_ALL_ED_FT
[facility]  Medicine  [address], [place]   Phone: [phone]  Fax:   Follow Up Time: Routine    [facility] ([facility])  Rehabilitation  [facility] [address] [place] [address]  Phone: [phone]  Fax:   Follow Up Time: Routine

## 2021-04-08 NOTE — ED ADULT NURSE NOTE - OBJECTIVE STATEMENT
Per son, pt fell today, pt pupils are asymmetric R>L. Pt is Alert and oriented x 2. Denies any sob, abdominal pain or pain in extremities.
discussed with hematology and plan for discharge home  Arlyn Bello MD

## 2021-04-08 NOTE — ED PROVIDER NOTE - CARE PLAN
Principal Discharge DX:	[facility] (altered mental status)  Secondary Diagnosis:	Fall, initial encounter  Secondary Diagnosis:	UTI (urinary tract infection)   Principal Discharge DX:	[facility] (altered mental status)  Secondary Diagnosis:	Fall, initial encounter
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
## 2021-04-14 DIAGNOSIS — S01.81XA LACERATION WITHOUT FOREIGN BODY OF OTHER PART OF HEAD, INITIAL ENCOUNTER: ICD-10-CM
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
## 2021-04-14 DIAGNOSIS — G45.9 TRANSIENT CEREBRAL ISCHEMIC ATTACK, UNSPECIFIED: ICD-10-CM

## 2021-04-14 DIAGNOSIS — I48.20 CHRONIC ATRIAL FIBRILLATION, UNSPECIFIED: ICD-10-CM

## 2021-04-14 DIAGNOSIS — E03.9 HYPOTHYROIDISM, UNSPECIFIED: ICD-10-CM
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
## 2024-04-25 NOTE — ED ADULT NURSE NOTE - HIV OFFER
No protocol for requested medication.    Medication:   gabapentin (NEURONTIN) 100 MG capsule   Last office visit date: 3/26/24  Pharmacy: [facility]/PHARMACY #[id number] - [place] - [address]    Order pended, routed to clinician for review.     .Patient comment: Visit with Dr. Cazares on 4/22/2024 - He recommended increasing gabapentin to 5 capsules daily (2 am, 2 pm, 1 midday)    Opt out